# Patient Record
Sex: FEMALE | Race: WHITE | NOT HISPANIC OR LATINO | Employment: FULL TIME | URBAN - NONMETROPOLITAN AREA
[De-identification: names, ages, dates, MRNs, and addresses within clinical notes are randomized per-mention and may not be internally consistent; named-entity substitution may affect disease eponyms.]

---

## 2024-05-15 ENCOUNTER — APPOINTMENT (OUTPATIENT)
Dept: PRIMARY CARE | Facility: CLINIC | Age: 54
End: 2024-05-15
Payer: COMMERCIAL

## 2024-05-22 ENCOUNTER — OFFICE VISIT (OUTPATIENT)
Dept: PRIMARY CARE | Facility: CLINIC | Age: 54
End: 2024-05-22
Payer: COMMERCIAL

## 2024-05-22 VITALS
WEIGHT: 137.25 LBS | BODY MASS INDEX: 25.91 KG/M2 | HEART RATE: 74 BPM | DIASTOLIC BLOOD PRESSURE: 84 MMHG | SYSTOLIC BLOOD PRESSURE: 130 MMHG | HEIGHT: 61 IN

## 2024-05-22 DIAGNOSIS — M79.7 FIBROMYALGIA: ICD-10-CM

## 2024-05-22 DIAGNOSIS — J30.2 SEASONAL ALLERGIES: ICD-10-CM

## 2024-05-22 DIAGNOSIS — I10 PRIMARY HYPERTENSION: ICD-10-CM

## 2024-05-22 DIAGNOSIS — E78.2 MIXED HYPERLIPIDEMIA: ICD-10-CM

## 2024-05-22 DIAGNOSIS — R73.03 PREDIABETES: ICD-10-CM

## 2024-05-22 DIAGNOSIS — Z12.11 SCREEN FOR COLON CANCER: ICD-10-CM

## 2024-05-22 DIAGNOSIS — Z13.21 ENCOUNTER FOR VITAMIN DEFICIENCY SCREENING: ICD-10-CM

## 2024-05-22 DIAGNOSIS — E03.9 ACQUIRED HYPOTHYROIDISM: ICD-10-CM

## 2024-05-22 DIAGNOSIS — Z76.89 ENCOUNTER TO ESTABLISH CARE: Primary | ICD-10-CM

## 2024-05-22 DIAGNOSIS — Z12.31 ENCOUNTER FOR SCREENING MAMMOGRAM FOR MALIGNANT NEOPLASM OF BREAST: ICD-10-CM

## 2024-05-22 PROBLEM — K57.90 DIVERTICULOSIS: Status: ACTIVE | Noted: 2024-05-22

## 2024-05-22 PROBLEM — M25.50 MULTIPLE JOINT PAIN: Status: RESOLVED | Noted: 2017-09-22 | Resolved: 2024-05-22

## 2024-05-22 PROBLEM — E78.5 HYPERLIPIDEMIA: Status: ACTIVE | Noted: 2021-10-25

## 2024-05-22 PROBLEM — U07.1 COVID-19: Status: RESOLVED | Noted: 2021-10-11 | Resolved: 2024-05-22

## 2024-05-22 PROBLEM — F32.1 MODERATE MAJOR DEPRESSION (MULTI): Status: RESOLVED | Noted: 2022-11-02 | Resolved: 2024-05-22

## 2024-05-22 PROBLEM — N60.01 CYST OF RIGHT BREAST: Status: RESOLVED | Noted: 2021-10-25 | Resolved: 2024-05-22

## 2024-05-22 PROBLEM — R42 VERTIGO: Status: RESOLVED | Noted: 2021-11-09 | Resolved: 2024-05-22

## 2024-05-22 PROBLEM — G47.00 INSOMNIA: Status: RESOLVED | Noted: 2021-11-09 | Resolved: 2024-05-22

## 2024-05-22 PROBLEM — D64.9 ANEMIA: Status: RESOLVED | Noted: 2021-11-09 | Resolved: 2024-05-22

## 2024-05-22 PROBLEM — Z90.711 HISTORY OF PARTIAL HYSTERECTOMY: Status: RESOLVED | Noted: 2021-10-25 | Resolved: 2024-05-22

## 2024-05-22 PROBLEM — R16.0 ENLARGED LIVER: Status: ACTIVE | Noted: 2024-05-22

## 2024-05-22 PROCEDURE — 3079F DIAST BP 80-89 MM HG: CPT | Performed by: NURSE PRACTITIONER

## 2024-05-22 PROCEDURE — 3075F SYST BP GE 130 - 139MM HG: CPT | Performed by: NURSE PRACTITIONER

## 2024-05-22 PROCEDURE — 99204 OFFICE O/P NEW MOD 45 MIN: CPT | Performed by: NURSE PRACTITIONER

## 2024-05-22 PROCEDURE — 3008F BODY MASS INDEX DOCD: CPT | Performed by: NURSE PRACTITIONER

## 2024-05-22 PROCEDURE — 1036F TOBACCO NON-USER: CPT | Performed by: NURSE PRACTITIONER

## 2024-05-22 RX ORDER — MONTELUKAST SODIUM 10 MG/1
10 TABLET ORAL NIGHTLY
COMMUNITY
End: 2024-05-22 | Stop reason: SDUPTHER

## 2024-05-22 RX ORDER — LEVOTHYROXINE SODIUM 75 UG/1
75 TABLET ORAL DAILY
COMMUNITY

## 2024-05-22 RX ORDER — LISINOPRIL 20 MG/1
20 TABLET ORAL
COMMUNITY
Start: 2023-07-06 | End: 2024-06-03

## 2024-05-22 RX ORDER — MONTELUKAST SODIUM 10 MG/1
10 TABLET ORAL NIGHTLY
Qty: 90 TABLET | Refills: 1 | Status: SHIPPED | OUTPATIENT
Start: 2024-05-22

## 2024-05-22 RX ORDER — ROSUVASTATIN CALCIUM 10 MG/1
10 TABLET, COATED ORAL DAILY
COMMUNITY
Start: 2023-12-12

## 2024-05-22 ASSESSMENT — ENCOUNTER SYMPTOMS
NAUSEA: 0
VOMITING: 0
COLOR CHANGE: 0
DYSURIA: 0
CHEST TIGHTNESS: 0
FATIGUE: 1
CONSTIPATION: 0
PALPITATIONS: 0
DIZZINESS: 0
BLOOD IN STOOL: 0
ARTHRALGIAS: 1
LIGHT-HEADEDNESS: 0
DIARRHEA: 0
HEADACHES: 0
ABDOMINAL PAIN: 0
MYALGIAS: 1
SHORTNESS OF BREATH: 0
PSYCHIATRIC NEGATIVE: 1

## 2024-05-22 NOTE — ASSESSMENT & PLAN NOTE
We discussed trial of Cymbalta and muscle relaxer.   She refused at this time. She reports that she just weaned herself off of fluoxetine because it made her too tired.

## 2024-05-22 NOTE — PROGRESS NOTES
"Subjective   Patient ID: Mariaelena Donis is a 54 y.o. female who presents for Establish Care.    HPI   NPV    Mariaelena here to establish care. Previous PCP Dr. Valverde. Has history of hypothyroid, HTN, Hyperlipidemia, depression/anxiety, diverticulosis, fibromyalgia.     Has noticed she has gained weight recently about 5#. Has not had her thyroid levels checked for about a year.  Feeling fatigued.     Hypothyroid: has been  stable, needs lab work.     HTN: slightly elevated today, has been on lisinopril 20 mg. She denies chest pain/tightness. No edema noted.     Prediabetes: she was unaware of this diagnosis. Last A1c done 12/2022. Will get this rechecked.     Fibromyalgia: she is asking for something stronger than over the counter for pain relief of fibromyalgia. .         Mammogram: needs  Colon cancer screen: referred to general surgery    Review of Systems   Constitutional:  Positive for fatigue.   HENT: Negative.     Respiratory:  Negative for chest tightness and shortness of breath.    Cardiovascular:  Negative for chest pain, palpitations and leg swelling.   Gastrointestinal:  Negative for abdominal pain, blood in stool, constipation, diarrhea, nausea and vomiting.   Genitourinary:  Negative for dysuria.   Musculoskeletal:  Positive for arthralgias and myalgias.   Skin:  Negative for color change.   Neurological:  Negative for dizziness, light-headedness and headaches.   Psychiatric/Behavioral: Negative.         Objective   /84   Pulse 74   Ht 1.549 m (5' 1\")   Wt 62.3 kg (137 lb 4 oz)   BMI 25.93 kg/m²     Physical Exam  Vitals and nursing note reviewed.   Constitutional:       Appearance: Normal appearance.   HENT:      Head: Normocephalic and atraumatic.   Cardiovascular:      Rate and Rhythm: Normal rate and regular rhythm.      Pulses: Normal pulses.      Heart sounds: Normal heart sounds.   Pulmonary:      Effort: Pulmonary effort is normal.      Breath sounds: Normal breath sounds.   Abdominal: "      General: Bowel sounds are normal.      Palpations: Abdomen is soft.   Musculoskeletal:      Cervical back: Normal range of motion.   Skin:     General: Skin is warm and dry.   Neurological:      General: No focal deficit present.      Mental Status: She is alert and oriented to person, place, and time.   Psychiatric:         Mood and Affect: Mood normal.         Behavior: Behavior normal.         Thought Content: Thought content normal.         Judgment: Judgment normal.         Assessment/Plan   Problem List Items Addressed This Visit             ICD-10-CM    Fibromyalgia M79.7     We discussed trial of Cymbalta and muscle relaxer.   She refused at this time. She reports that she just weaned herself off of fluoxetine because it made her too tired.          Hyperlipidemia E78.5     Rosuvastatin 10 mg daily   Repeat lipid panel         Relevant Orders    Comprehensive Metabolic Panel    Lipid Panel    Hypertension I10     A little elevated today.   Lisinopril 20 mg daily  Continue to take medications as prescribed. Continue diet (low salt) and exercise as advised.          Relevant Orders    CBC and Auto Differential    Hypothyroidism E03.9     Check thyroid function.  Currently on levothyroxine 75 mcg daily.          Relevant Orders    TSH with reflex to Free T4 if abnormal    Prediabetes R73.03     Check A1c  12/14/22: Last A1c was 5.8%-she was unaware of this diagnosis.         Relevant Orders    Hemoglobin A1C    BMI 25.0-25.9,adult Z68.25     Other Visit Diagnoses         Codes    Encounter to establish care    -  Primary Z76.89    Seasonal allergies     J30.2    Relevant Medications    montelukast (Singulair) 10 mg tablet    Encounter for vitamin deficiency screening     Z13.21    Relevant Orders    Vitamin D 25-Hydroxy,Total (for eval of Vitamin D levels)    Vitamin B12    Encounter for screening mammogram for malignant neoplasm of breast     Z12.31    Relevant Orders    BI mammo bilateral screening  tomosynthesis    Screen for colon cancer     Z12.11    Relevant Orders    Referral to General Surgery              Follow up in 6 months. We will contact her with results of her lab work.

## 2024-05-22 NOTE — ASSESSMENT & PLAN NOTE
A little elevated today.   Lisinopril 20 mg daily  Continue to take medications as prescribed. Continue diet (low salt) and exercise as advised.

## 2024-05-23 ENCOUNTER — TELEPHONE (OUTPATIENT)
Dept: SURGERY | Facility: CLINIC | Age: 54
End: 2024-05-23
Payer: COMMERCIAL

## 2024-05-23 NOTE — TELEPHONE ENCOUNTER
Received open accessed from Tori Carolina  .  Left message on  5-23-24   . If patient does not return our call we will return open access back to referring provider.     Lmom 7-9-24.    [id number]    Sent referral back to Tori Negron office on 12-20-24.

## 2024-05-28 ENCOUNTER — LAB (OUTPATIENT)
Dept: LAB | Facility: LAB | Age: 54
End: 2024-05-28
Payer: COMMERCIAL

## 2024-05-28 DIAGNOSIS — Z13.21 ENCOUNTER FOR VITAMIN DEFICIENCY SCREENING: ICD-10-CM

## 2024-05-28 DIAGNOSIS — R73.03 PREDIABETES: ICD-10-CM

## 2024-05-28 DIAGNOSIS — I10 PRIMARY HYPERTENSION: ICD-10-CM

## 2024-05-28 DIAGNOSIS — E03.9 ACQUIRED HYPOTHYROIDISM: ICD-10-CM

## 2024-05-28 DIAGNOSIS — D64.9 ANEMIA, UNSPECIFIED TYPE: ICD-10-CM

## 2024-05-28 DIAGNOSIS — E78.2 MIXED HYPERLIPIDEMIA: ICD-10-CM

## 2024-05-28 LAB
25(OH)D3 SERPL-MCNC: 42 NG/ML (ref 30–100)
ALBUMIN SERPL BCP-MCNC: 4.4 G/DL (ref 3.4–5)
ALP SERPL-CCNC: 101 U/L (ref 33–110)
ALT SERPL W P-5'-P-CCNC: 19 U/L (ref 7–45)
ANION GAP SERPL CALC-SCNC: 14 MMOL/L (ref 10–20)
AST SERPL W P-5'-P-CCNC: 26 U/L (ref 9–39)
BASOPHILS # BLD AUTO: 0.01 X10*3/UL (ref 0–0.1)
BASOPHILS NFR BLD AUTO: 0.2 %
BILIRUB SERPL-MCNC: 0.4 MG/DL (ref 0–1.2)
BUN SERPL-MCNC: 18 MG/DL (ref 6–23)
CALCIUM SERPL-MCNC: 9.4 MG/DL (ref 8.6–10.3)
CHLORIDE SERPL-SCNC: 106 MMOL/L (ref 98–107)
CHOLEST SERPL-MCNC: 160 MG/DL (ref 0–199)
CHOLESTEROL/HDL RATIO: 2.9
CO2 SERPL-SCNC: 26 MMOL/L (ref 21–32)
CREAT SERPL-MCNC: 0.73 MG/DL (ref 0.5–1.05)
EGFRCR SERPLBLD CKD-EPI 2021: >90 ML/MIN/1.73M*2
EOSINOPHIL # BLD AUTO: 0.18 X10*3/UL (ref 0–0.7)
EOSINOPHIL NFR BLD AUTO: 2.8 %
ERYTHROCYTE [DISTWIDTH] IN BLOOD BY AUTOMATED COUNT: 13.2 % (ref 11.5–14.5)
EST. AVERAGE GLUCOSE BLD GHB EST-MCNC: 117 MG/DL
GLUCOSE SERPL-MCNC: 99 MG/DL (ref 74–99)
HBA1C MFR BLD: 5.7 %
HCT VFR BLD AUTO: 34 % (ref 36–46)
HDLC SERPL-MCNC: 56 MG/DL
HGB BLD-MCNC: 11.3 G/DL (ref 12–16)
IMM GRANULOCYTES # BLD AUTO: 0.01 X10*3/UL (ref 0–0.7)
IMM GRANULOCYTES NFR BLD AUTO: 0.2 % (ref 0–0.9)
LDLC SERPL CALC-MCNC: 69 MG/DL
LYMPHOCYTES # BLD AUTO: 2.42 X10*3/UL (ref 1.2–4.8)
LYMPHOCYTES NFR BLD AUTO: 37.5 %
MCH RBC QN AUTO: 28.8 PG (ref 26–34)
MCHC RBC AUTO-ENTMCNC: 33.2 G/DL (ref 32–36)
MCV RBC AUTO: 87 FL (ref 80–100)
MONOCYTES # BLD AUTO: 0.63 X10*3/UL (ref 0.1–1)
MONOCYTES NFR BLD AUTO: 9.8 %
NEUTROPHILS # BLD AUTO: 3.21 X10*3/UL (ref 1.2–7.7)
NEUTROPHILS NFR BLD AUTO: 49.5 %
NON HDL CHOLESTEROL: 104 MG/DL (ref 0–149)
NRBC BLD-RTO: 0 /100 WBCS (ref 0–0)
PLATELET # BLD AUTO: 316 X10*3/UL (ref 150–450)
POTASSIUM SERPL-SCNC: 3.8 MMOL/L (ref 3.5–5.3)
PROT SERPL-MCNC: 6.8 G/DL (ref 6.4–8.2)
RBC # BLD AUTO: 3.92 X10*6/UL (ref 4–5.2)
SODIUM SERPL-SCNC: 142 MMOL/L (ref 136–145)
TRIGL SERPL-MCNC: 175 MG/DL (ref 0–149)
TSH SERPL-ACNC: 1.24 MIU/L (ref 0.44–3.98)
VIT B12 SERPL-MCNC: 174 PG/ML (ref 211–911)
VLDL: 35 MG/DL (ref 0–40)
WBC # BLD AUTO: 6.5 X10*3/UL (ref 4.4–11.3)

## 2024-05-28 PROCEDURE — 85025 COMPLETE CBC W/AUTO DIFF WBC: CPT

## 2024-05-28 PROCEDURE — 84443 ASSAY THYROID STIM HORMONE: CPT

## 2024-05-28 PROCEDURE — 36415 COLL VENOUS BLD VENIPUNCTURE: CPT

## 2024-05-28 PROCEDURE — 82728 ASSAY OF FERRITIN: CPT

## 2024-05-28 PROCEDURE — 80061 LIPID PANEL: CPT

## 2024-05-28 PROCEDURE — 83540 ASSAY OF IRON: CPT

## 2024-05-28 PROCEDURE — 80053 COMPREHEN METABOLIC PANEL: CPT

## 2024-05-28 PROCEDURE — 82746 ASSAY OF FOLIC ACID SERUM: CPT

## 2024-05-28 PROCEDURE — 83550 IRON BINDING TEST: CPT

## 2024-05-28 PROCEDURE — 82607 VITAMIN B-12: CPT

## 2024-05-28 PROCEDURE — 82306 VITAMIN D 25 HYDROXY: CPT

## 2024-05-28 PROCEDURE — 83036 HEMOGLOBIN GLYCOSYLATED A1C: CPT

## 2024-05-29 DIAGNOSIS — D64.9 ANEMIA, UNSPECIFIED TYPE: Primary | ICD-10-CM

## 2024-05-29 DIAGNOSIS — E53.8 VITAMIN B12 DEFICIENCY: ICD-10-CM

## 2024-05-29 LAB
FERRITIN SERPL-MCNC: 24 NG/ML (ref 8–150)
FOLATE SERPL-MCNC: >22.3 NG/ML
IRON SATN MFR SERPL: 21 % (ref 25–45)
IRON SERPL-MCNC: 85 UG/DL (ref 35–150)
TIBC SERPL-MCNC: 401 UG/DL (ref 240–445)
UIBC SERPL-MCNC: 316 UG/DL (ref 110–370)

## 2024-05-29 RX ORDER — LANOLIN ALCOHOL/MO/W.PET/CERES
1000 CREAM (GRAM) TOPICAL DAILY
Qty: 90 TABLET | Refills: 1 | Status: SHIPPED | OUTPATIENT
Start: 2024-05-29

## 2024-05-29 NOTE — RESULT ENCOUNTER NOTE
Vitamin B12 level low at 174---start B12 1000 mcg daily--sent to pharmacy  A1c 5.7%  Lipid panel okay--triglycerides slightly elevated at 175  Blood counts?? Anemia--add iron studies  Vitamin D fine  TSH fine  Electrolyte/liver/kidney panel fine    EPIC message sent

## 2024-05-30 ENCOUNTER — HOSPITAL ENCOUNTER (OUTPATIENT)
Dept: RADIOLOGY | Facility: CLINIC | Age: 54
End: 2024-05-30
Payer: COMMERCIAL

## 2024-06-04 ENCOUNTER — HOSPITAL ENCOUNTER (OUTPATIENT)
Dept: RADIOLOGY | Facility: HOSPITAL | Age: 54
Discharge: HOME | End: 2024-06-04
Payer: COMMERCIAL

## 2024-06-04 VITALS — HEIGHT: 60 IN | WEIGHT: 135 LBS | BODY MASS INDEX: 26.5 KG/M2

## 2024-06-04 DIAGNOSIS — Z12.31 ENCOUNTER FOR SCREENING MAMMOGRAM FOR MALIGNANT NEOPLASM OF BREAST: ICD-10-CM

## 2024-06-04 PROCEDURE — 77067 SCR MAMMO BI INCL CAD: CPT | Performed by: RADIOLOGY

## 2024-06-04 PROCEDURE — 77063 BREAST TOMOSYNTHESIS BI: CPT | Performed by: RADIOLOGY

## 2024-06-04 PROCEDURE — 77067 SCR MAMMO BI INCL CAD: CPT

## 2024-06-10 ENCOUNTER — HOSPITAL ENCOUNTER (OUTPATIENT)
Dept: RADIOLOGY | Facility: EXTERNAL LOCATION | Age: 54
Discharge: HOME | End: 2024-06-10
Payer: COMMERCIAL

## 2024-06-12 DIAGNOSIS — I10 PRIMARY HYPERTENSION: Primary | ICD-10-CM

## 2024-06-12 RX ORDER — LISINOPRIL 20 MG/1
20 TABLET ORAL
Qty: 90 TABLET | Refills: 1 | Status: SHIPPED | OUTPATIENT
Start: 2024-06-12

## 2024-07-03 ENCOUNTER — TELEPHONE (OUTPATIENT)
Dept: PRIMARY CARE | Facility: CLINIC | Age: 54
End: 2024-07-03
Payer: COMMERCIAL

## 2024-07-03 DIAGNOSIS — K57.92 DIVERTICULITIS: Primary | ICD-10-CM

## 2024-07-03 RX ORDER — METRONIDAZOLE 500 MG/1
500 TABLET ORAL 3 TIMES DAILY
Qty: 21 TABLET | Refills: 0 | Status: SHIPPED | OUTPATIENT
Start: 2024-07-03 | End: 2024-07-10

## 2024-07-03 RX ORDER — CIPROFLOXACIN 500 MG/1
500 TABLET ORAL 2 TIMES DAILY
Qty: 14 TABLET | Refills: 0 | Status: SHIPPED | OUTPATIENT
Start: 2024-07-03 | End: 2024-07-10

## 2024-07-03 NOTE — TELEPHONE ENCOUNTER
Mariaelena called in she is having a flare up of her diverticulitis   low grade fever, abdominal pain, constipation, nausea      She is asking for ATRYAN Bobo

## 2024-07-11 ENCOUNTER — HOSPITAL ENCOUNTER (EMERGENCY)
Facility: HOSPITAL | Age: 54
Discharge: HOME | End: 2024-07-11
Attending: EMERGENCY MEDICINE
Payer: COMMERCIAL

## 2024-07-11 ENCOUNTER — APPOINTMENT (OUTPATIENT)
Dept: RADIOLOGY | Facility: HOSPITAL | Age: 54
End: 2024-07-11
Payer: COMMERCIAL

## 2024-07-11 VITALS
WEIGHT: 135 LBS | TEMPERATURE: 98.3 F | OXYGEN SATURATION: 98 % | BODY MASS INDEX: 25.49 KG/M2 | SYSTOLIC BLOOD PRESSURE: 145 MMHG | HEIGHT: 61 IN | DIASTOLIC BLOOD PRESSURE: 76 MMHG | RESPIRATION RATE: 16 BRPM | HEART RATE: 70 BPM

## 2024-07-11 DIAGNOSIS — R10.9 ABDOMINAL PAIN, UNSPECIFIED ABDOMINAL LOCATION: Primary | ICD-10-CM

## 2024-07-11 LAB
ALBUMIN SERPL BCP-MCNC: 4.4 G/DL (ref 3.4–5)
ALP SERPL-CCNC: 110 U/L (ref 33–110)
ALT SERPL W P-5'-P-CCNC: 21 U/L (ref 7–45)
ANION GAP SERPL CALC-SCNC: 13 MMOL/L (ref 10–20)
APPEARANCE UR: CLEAR
AST SERPL W P-5'-P-CCNC: 27 U/L (ref 9–39)
BASOPHILS # BLD AUTO: 0.01 X10*3/UL (ref 0–0.1)
BASOPHILS NFR BLD AUTO: 0.1 %
BILIRUB SERPL-MCNC: 0.3 MG/DL (ref 0–1.2)
BILIRUB UR STRIP.AUTO-MCNC: NEGATIVE MG/DL
BUN SERPL-MCNC: 12 MG/DL (ref 6–23)
CALCIUM SERPL-MCNC: 9.3 MG/DL (ref 8.6–10.3)
CHLORIDE SERPL-SCNC: 102 MMOL/L (ref 98–107)
CO2 SERPL-SCNC: 27 MMOL/L (ref 21–32)
COLOR UR: COLORLESS
CREAT SERPL-MCNC: 0.66 MG/DL (ref 0.5–1.05)
EGFRCR SERPLBLD CKD-EPI 2021: >90 ML/MIN/1.73M*2
EOSINOPHIL # BLD AUTO: 0.11 X10*3/UL (ref 0–0.7)
EOSINOPHIL NFR BLD AUTO: 1.2 %
ERYTHROCYTE [DISTWIDTH] IN BLOOD BY AUTOMATED COUNT: 12.9 % (ref 11.5–14.5)
GLUCOSE SERPL-MCNC: 107 MG/DL (ref 74–99)
GLUCOSE UR STRIP.AUTO-MCNC: NORMAL MG/DL
HCT VFR BLD AUTO: 35.9 % (ref 36–46)
HGB BLD-MCNC: 11.7 G/DL (ref 12–16)
IMM GRANULOCYTES # BLD AUTO: 0.03 X10*3/UL (ref 0–0.7)
IMM GRANULOCYTES NFR BLD AUTO: 0.3 % (ref 0–0.9)
KETONES UR STRIP.AUTO-MCNC: NEGATIVE MG/DL
LEUKOCYTE ESTERASE UR QL STRIP.AUTO: NEGATIVE
LIPASE SERPL-CCNC: 28 U/L (ref 9–82)
LYMPHOCYTES # BLD AUTO: 2.13 X10*3/UL (ref 1.2–4.8)
LYMPHOCYTES NFR BLD AUTO: 23.4 %
MCH RBC QN AUTO: 28.6 PG (ref 26–34)
MCHC RBC AUTO-ENTMCNC: 32.6 G/DL (ref 32–36)
MCV RBC AUTO: 88 FL (ref 80–100)
MONOCYTES # BLD AUTO: 0.64 X10*3/UL (ref 0.1–1)
MONOCYTES NFR BLD AUTO: 7 %
NEUTROPHILS # BLD AUTO: 6.19 X10*3/UL (ref 1.2–7.7)
NEUTROPHILS NFR BLD AUTO: 68 %
NITRITE UR QL STRIP.AUTO: NEGATIVE
NRBC BLD-RTO: 0 /100 WBCS (ref 0–0)
PH UR STRIP.AUTO: 6.5 [PH]
PLATELET # BLD AUTO: 373 X10*3/UL (ref 150–450)
POTASSIUM SERPL-SCNC: 3.8 MMOL/L (ref 3.5–5.3)
PROT SERPL-MCNC: 7.4 G/DL (ref 6.4–8.2)
PROT UR STRIP.AUTO-MCNC: NEGATIVE MG/DL
RBC # BLD AUTO: 4.09 X10*6/UL (ref 4–5.2)
RBC # UR STRIP.AUTO: NEGATIVE /UL
SODIUM SERPL-SCNC: 138 MMOL/L (ref 136–145)
SP GR UR STRIP.AUTO: 1.01
UROBILINOGEN UR STRIP.AUTO-MCNC: NORMAL MG/DL
WBC # BLD AUTO: 9.1 X10*3/UL (ref 4.4–11.3)

## 2024-07-11 PROCEDURE — 2500000004 HC RX 250 GENERAL PHARMACY W/ HCPCS (ALT 636 FOR OP/ED): Performed by: EMERGENCY MEDICINE

## 2024-07-11 PROCEDURE — 81003 URINALYSIS AUTO W/O SCOPE: CPT | Performed by: EMERGENCY MEDICINE

## 2024-07-11 PROCEDURE — 74177 CT ABD & PELVIS W/CONTRAST: CPT | Performed by: RADIOLOGY

## 2024-07-11 PROCEDURE — 85025 COMPLETE CBC W/AUTO DIFF WBC: CPT | Performed by: EMERGENCY MEDICINE

## 2024-07-11 PROCEDURE — 74177 CT ABD & PELVIS W/CONTRAST: CPT

## 2024-07-11 PROCEDURE — 80053 COMPREHEN METABOLIC PANEL: CPT | Performed by: EMERGENCY MEDICINE

## 2024-07-11 PROCEDURE — 36415 COLL VENOUS BLD VENIPUNCTURE: CPT | Performed by: EMERGENCY MEDICINE

## 2024-07-11 PROCEDURE — 83690 ASSAY OF LIPASE: CPT | Performed by: EMERGENCY MEDICINE

## 2024-07-11 PROCEDURE — 99284 EMERGENCY DEPT VISIT MOD MDM: CPT | Mod: 25

## 2024-07-11 PROCEDURE — 2550000001 HC RX 255 CONTRASTS: Performed by: EMERGENCY MEDICINE

## 2024-07-11 PROCEDURE — 96360 HYDRATION IV INFUSION INIT: CPT | Mod: 59

## 2024-07-11 RX ORDER — PANTOPRAZOLE SODIUM 20 MG/1
20 TABLET, DELAYED RELEASE ORAL DAILY
Qty: 20 TABLET | Refills: 0 | Status: SHIPPED | OUTPATIENT
Start: 2024-07-11 | End: 2024-07-31

## 2024-07-11 RX ORDER — NITROFURANTOIN 25; 75 MG/1; MG/1
100 CAPSULE ORAL EVERY 12 HOURS SCHEDULED
COMMUNITY
Start: 2024-06-28 | End: 2024-07-11 | Stop reason: ENTERED-IN-ERROR

## 2024-07-11 RX ADMIN — SODIUM CHLORIDE 1000 ML: 9 INJECTION, SOLUTION INTRAVENOUS at 13:45

## 2024-07-11 RX ADMIN — IOHEXOL 70 ML: 350 INJECTION, SOLUTION INTRAVENOUS at 14:27

## 2024-07-11 ASSESSMENT — PAIN DESCRIPTION - LOCATION: LOCATION: ABDOMEN

## 2024-07-11 ASSESSMENT — PAIN - FUNCTIONAL ASSESSMENT: PAIN_FUNCTIONAL_ASSESSMENT: 0-10

## 2024-07-11 ASSESSMENT — COLUMBIA-SUICIDE SEVERITY RATING SCALE - C-SSRS
2. HAVE YOU ACTUALLY HAD ANY THOUGHTS OF KILLING YOURSELF?: NO
6. HAVE YOU EVER DONE ANYTHING, STARTED TO DO ANYTHING, OR PREPARED TO DO ANYTHING TO END YOUR LIFE?: NO
1. IN THE PAST MONTH, HAVE YOU WISHED YOU WERE DEAD OR WISHED YOU COULD GO TO SLEEP AND NOT WAKE UP?: NO

## 2024-07-11 ASSESSMENT — PAIN DESCRIPTION - PAIN TYPE: TYPE: ACUTE PAIN

## 2024-07-11 ASSESSMENT — PAIN DESCRIPTION - ORIENTATION: ORIENTATION: LEFT;LOWER

## 2024-07-11 ASSESSMENT — PAIN SCALES - GENERAL: PAINLEVEL_OUTOF10: 10 - WORST POSSIBLE PAIN

## 2024-07-11 NOTE — ED PROVIDER NOTES
HPI   Chief Complaint   Patient presents with    Abdominal Pain     LRQ abdominal pain today 10/10.  Hx of diverticulitis.  Nauseated.   Was on antibiotics but stopped.        Patient presents to the emergency department secondary to abdominal pain.  Further history reveals that the patient has a longstanding history of diverticular disease.  She called her physician's office 8 days ago secondary to left lower quadrant pain which she felt was secondary to diverticulitis, however she only took the antibiotics for 3 days if she felt it was making her symptoms worse.  Presents now to the emergency room to be further evaluated.  Last p.o. intake was last night consisting of a fried chicken sandwich.      History provided by:  Patient   used: No                        No data recorded                   Patient History   Past Medical History:   Diagnosis Date    Anemia 11/09/2021    Last Assessment & Plan:    Formatting of this note might be different from the original.   Unsure about the reason for her acute anemia if it is related to acute illness versus other etiology.  Could be a reason for her worsening dizziness.  It is normocytic normochromic anemia which goes against vitamin B12 deficiency or folate these were also tested and negative or iron deficiency anemia.  We wi    COVID-19 10/11/2021    Last Assessment & Plan:    Formatting of this note might be different from the original.   -echo to evaluate for left heart function    -CT 10/9/21 showed 'enlarged heart'-concern for cardiomyopathy    No symptoms at this time    Disease of thyroid gland     History of partial hysterectomy 10/25/2021    Last Assessment & Plan:    Formatting of this note might be different from the original.   Referral to [facility]   -pt has not had pap smear in >5 years   -hx of partial hysterectomy -cervix intact    Hypertension     Insomnia 11/09/2021    Last Assessment & Plan:    Formatting of this note might  be different from the original.   Trial of trazodone today at 50 mg, if intolerant to side effects or having increased sedation can decrease to 25 mg.      Nonpharmacological treatment of insomnia      FDA approved Somrest and Sleepio       Sleep hygiene       Encourage good sleeping habits   1.Regular sleep schedule, avoid naps   2.Avoid ca    Moderate major depression (Multi) 11/02/2022    Last Assessment & Plan:    Formatting of this note might be different from the original.   PHQ-9 score of 7 MELLISSA-7 score of 3.   Slightly improving on increase Prozac at 40 mg.   With a low energy she is having a might benefit from Wellbutrin in addition, starting at 150.   Follow-up in 3 months.   Still hesitant to pursue counseling at this time.    Multiple joint pain 09/22/2017    Vertigo 11/09/2021    Last Assessment & Plan:    Formatting of this note might be different from the original.   Completely resolved not on any medications for it . Most likely post COVID infection related.   Continue to monitor     Past Surgical History:   Procedure Laterality Date    CHOLECYSTECTOMY      HYSTERECTOMY       Family History   Problem Relation Name Age of Onset    Hypertension Mother       Social History     Tobacco Use    Smoking status: Never    Smokeless tobacco: Never   Vaping Use    Vaping status: Never Used   Substance Use Topics    Alcohol use: Never    Drug use: Never       Physical Exam   ED Triage Vitals [07/11/24 1303]   Temperature Heart Rate Respirations BP   36.8 °C (98.3 °F) 71 16 134/79      Pulse Ox Temp Source Heart Rate Source Patient Position   98 % Temporal Monitor Sitting      BP Location FiO2 (%)     Left arm --       Physical Exam  Vitals and nursing note reviewed.   Constitutional:       General: She is not in acute distress.     Appearance: Normal appearance. She is normal weight. She is not ill-appearing, toxic-appearing or diaphoretic.   HENT:      Head: Normocephalic and atraumatic.      Nose: Nose normal.  No rhinorrhea.   Eyes:      General: No scleral icterus.  Neck:      Comments: Trachea is midline  Cardiovascular:      Rate and Rhythm: Normal rate and regular rhythm.      Heart sounds: No murmur heard.  Pulmonary:      Effort: Pulmonary effort is normal.      Breath sounds: Normal breath sounds. No wheezing.   Abdominal:      General: Abdomen is flat. Bowel sounds are normal. There is no distension.      Palpations: Abdomen is soft.      Tenderness: There is abdominal tenderness in the left lower quadrant. There is no right CVA tenderness, left CVA tenderness, guarding or rebound. Negative signs include Paredes's sign, Rovsing's sign, McBurney's sign, psoas sign and obturator sign.   Musculoskeletal:         General: Normal range of motion.      Cervical back: Normal range of motion.   Skin:     General: Skin is warm and dry.      Coloration: Skin is not jaundiced.      Findings: No rash.   Neurological:      General: No focal deficit present.      Mental Status: She is alert and oriented to person, place, and time. Mental status is at baseline.   Psychiatric:         Mood and Affect: Mood normal.         Behavior: Behavior normal.         Thought Content: Thought content normal.         Judgment: Judgment normal.         ED Course & MDM   Diagnoses as of 07/11/24 1519   Abdominal pain, unspecified abdominal location       Medical Decision Making  I printed off a copy of the patient's CAT scan report and highlighted the finding of the pancreatic lesion.  This has been present since October 2021 making it unlikely to be malignant however the interpreting radiologist is recommending a follow-up MRI for further clarification and this was explained to the patient and she understands this importance.  Patient will be referred to gastroenterology for follow-up.  She has never had a colonoscopy.  I explained the importance of this not only in regards to further diagnosing her current symptomatology but also from cancer  screening and she understands this importance.  She will be placed on a proton pump inhibitor and instructed to follow a bland diet.  Return at anytime if worse.        Procedure  Procedures     Maximo Mckeon,   07/11/24 1528

## 2024-07-12 LAB — HOLD SPECIMEN: NORMAL

## 2024-11-20 ENCOUNTER — APPOINTMENT (OUTPATIENT)
Dept: PRIMARY CARE | Facility: CLINIC | Age: 54
End: 2024-11-20
Payer: COMMERCIAL

## 2024-11-26 ENCOUNTER — APPOINTMENT (OUTPATIENT)
Age: 54
End: 2024-11-26
Payer: COMMERCIAL

## 2024-11-27 ENCOUNTER — APPOINTMENT (OUTPATIENT)
Age: 54
End: 2024-11-27
Payer: COMMERCIAL

## 2024-12-03 DIAGNOSIS — I10 PRIMARY HYPERTENSION: ICD-10-CM

## 2024-12-10 DIAGNOSIS — E53.8 B12 DEFICIENCY: ICD-10-CM

## 2024-12-10 DIAGNOSIS — E53.8 VITAMIN B12 DEFICIENCY: ICD-10-CM

## 2024-12-10 DIAGNOSIS — E78.2 MIXED HYPERLIPIDEMIA: ICD-10-CM

## 2024-12-10 DIAGNOSIS — R73.03 PREDIABETES: Primary | ICD-10-CM

## 2024-12-10 DIAGNOSIS — I10 PRIMARY HYPERTENSION: ICD-10-CM

## 2024-12-10 RX ORDER — LISINOPRIL 20 MG/1
20 TABLET ORAL DAILY
Qty: 90 TABLET | Refills: 0 | OUTPATIENT
Start: 2024-12-10

## 2024-12-10 RX ORDER — LISINOPRIL 20 MG/1
20 TABLET ORAL
Qty: 30 TABLET | Refills: 0 | Status: SHIPPED | OUTPATIENT
Start: 2024-12-10

## 2024-12-10 RX ORDER — LANOLIN ALCOHOL/MO/W.PET/CERES
1000 CREAM (GRAM) TOPICAL DAILY
Qty: 90 TABLET | Refills: 0 | OUTPATIENT
Start: 2024-12-10

## 2024-12-11 DIAGNOSIS — E53.8 VITAMIN B12 DEFICIENCY: ICD-10-CM

## 2024-12-11 RX ORDER — LANOLIN ALCOHOL/MO/W.PET/CERES
1000 CREAM (GRAM) TOPICAL DAILY
Qty: 30 TABLET | Refills: 0 | OUTPATIENT
Start: 2024-12-11

## 2024-12-18 ENCOUNTER — APPOINTMENT (OUTPATIENT)
Age: 54
End: 2024-12-18
Payer: COMMERCIAL

## 2025-01-08 ENCOUNTER — OFFICE VISIT (OUTPATIENT)
Age: 55
End: 2025-01-08
Payer: COMMERCIAL

## 2025-01-08 ENCOUNTER — LAB (OUTPATIENT)
Facility: LAB | Age: 55
End: 2025-01-08
Payer: COMMERCIAL

## 2025-01-08 ENCOUNTER — HOSPITAL ENCOUNTER (OUTPATIENT)
Dept: RADIOLOGY | Facility: HOSPITAL | Age: 55
Discharge: HOME | End: 2025-01-08
Payer: COMMERCIAL

## 2025-01-08 VITALS
BODY MASS INDEX: 25.89 KG/M2 | HEIGHT: 61 IN | WEIGHT: 137.13 LBS | HEART RATE: 71 BPM | DIASTOLIC BLOOD PRESSURE: 80 MMHG | SYSTOLIC BLOOD PRESSURE: 124 MMHG

## 2025-01-08 DIAGNOSIS — E53.8 B12 DEFICIENCY: ICD-10-CM

## 2025-01-08 DIAGNOSIS — E03.9 ACQUIRED HYPOTHYROIDISM: ICD-10-CM

## 2025-01-08 DIAGNOSIS — R73.03 PREDIABETES: ICD-10-CM

## 2025-01-08 DIAGNOSIS — R07.81 RIB PAIN ON RIGHT SIDE: ICD-10-CM

## 2025-01-08 DIAGNOSIS — K86.2 PANCREATIC CYST (HHS-HCC): ICD-10-CM

## 2025-01-08 DIAGNOSIS — E78.2 MIXED HYPERLIPIDEMIA: ICD-10-CM

## 2025-01-08 DIAGNOSIS — I10 PRIMARY HYPERTENSION: Primary | ICD-10-CM

## 2025-01-08 DIAGNOSIS — I10 PRIMARY HYPERTENSION: ICD-10-CM

## 2025-01-08 LAB
ALBUMIN SERPL BCP-MCNC: 4.9 G/DL (ref 3.4–5)
ALP SERPL-CCNC: 110 U/L (ref 33–110)
ALT SERPL W P-5'-P-CCNC: 20 U/L (ref 7–45)
ANION GAP SERPL CALC-SCNC: 12 MMOL/L (ref 10–20)
AST SERPL W P-5'-P-CCNC: 28 U/L (ref 9–39)
BASOPHILS # BLD AUTO: 0 X10*3/UL (ref 0–0.1)
BASOPHILS NFR BLD AUTO: 0 %
BILIRUB SERPL-MCNC: 0.4 MG/DL (ref 0–1.2)
BUN SERPL-MCNC: 11 MG/DL (ref 6–23)
CALCIUM SERPL-MCNC: 9.8 MG/DL (ref 8.6–10.3)
CHLORIDE SERPL-SCNC: 105 MMOL/L (ref 98–107)
CHOLEST SERPL-MCNC: 155 MG/DL (ref 0–199)
CHOLESTEROL/HDL RATIO: 2.4
CO2 SERPL-SCNC: 29 MMOL/L (ref 21–32)
CREAT SERPL-MCNC: 0.68 MG/DL (ref 0.5–1.05)
EGFRCR SERPLBLD CKD-EPI 2021: >90 ML/MIN/1.73M*2
EOSINOPHIL # BLD AUTO: 0.15 X10*3/UL (ref 0–0.7)
EOSINOPHIL NFR BLD AUTO: 2.4 %
ERYTHROCYTE [DISTWIDTH] IN BLOOD BY AUTOMATED COUNT: 13.3 % (ref 11.5–14.5)
GLUCOSE SERPL-MCNC: 100 MG/DL (ref 74–99)
HCT VFR BLD AUTO: 37.4 % (ref 36–46)
HDLC SERPL-MCNC: 64 MG/DL
HGB BLD-MCNC: 12 G/DL (ref 12–16)
IMM GRANULOCYTES # BLD AUTO: 0.01 X10*3/UL (ref 0–0.7)
IMM GRANULOCYTES NFR BLD AUTO: 0.2 % (ref 0–0.9)
LDLC SERPL CALC-MCNC: 68 MG/DL
LYMPHOCYTES # BLD AUTO: 2.27 X10*3/UL (ref 1.2–4.8)
LYMPHOCYTES NFR BLD AUTO: 36.1 %
MCH RBC QN AUTO: 28.8 PG (ref 26–34)
MCHC RBC AUTO-ENTMCNC: 32.1 G/DL (ref 32–36)
MCV RBC AUTO: 90 FL (ref 80–100)
MONOCYTES # BLD AUTO: 0.53 X10*3/UL (ref 0.1–1)
MONOCYTES NFR BLD AUTO: 8.4 %
NEUTROPHILS # BLD AUTO: 3.33 X10*3/UL (ref 1.2–7.7)
NEUTROPHILS NFR BLD AUTO: 52.9 %
NON HDL CHOLESTEROL: 91 MG/DL (ref 0–149)
NRBC BLD-RTO: 0 /100 WBCS (ref 0–0)
PLATELET # BLD AUTO: 310 X10*3/UL (ref 150–450)
POTASSIUM SERPL-SCNC: 4.3 MMOL/L (ref 3.5–5.3)
PROT SERPL-MCNC: 6.8 G/DL (ref 6.4–8.2)
RBC # BLD AUTO: 4.16 X10*6/UL (ref 4–5.2)
SODIUM SERPL-SCNC: 142 MMOL/L (ref 136–145)
TRIGL SERPL-MCNC: 115 MG/DL (ref 0–149)
TSH SERPL-ACNC: 0.98 MIU/L (ref 0.44–3.98)
VIT B12 SERPL-MCNC: 461 PG/ML (ref 211–911)
VLDL: 23 MG/DL (ref 0–40)
WBC # BLD AUTO: 6.3 X10*3/UL (ref 4.4–11.3)

## 2025-01-08 PROCEDURE — 3074F SYST BP LT 130 MM HG: CPT | Performed by: NURSE PRACTITIONER

## 2025-01-08 PROCEDURE — 3079F DIAST BP 80-89 MM HG: CPT | Performed by: NURSE PRACTITIONER

## 2025-01-08 PROCEDURE — 99214 OFFICE O/P EST MOD 30 MIN: CPT | Performed by: NURSE PRACTITIONER

## 2025-01-08 PROCEDURE — 83036 HEMOGLOBIN GLYCOSYLATED A1C: CPT

## 2025-01-08 PROCEDURE — 71101 X-RAY EXAM UNILAT RIBS/CHEST: CPT | Mod: RT

## 2025-01-08 PROCEDURE — 1036F TOBACCO NON-USER: CPT | Performed by: NURSE PRACTITIONER

## 2025-01-08 PROCEDURE — 3008F BODY MASS INDEX DOCD: CPT | Performed by: NURSE PRACTITIONER

## 2025-01-08 PROCEDURE — 71101 X-RAY EXAM UNILAT RIBS/CHEST: CPT | Mod: RIGHT SIDE | Performed by: STUDENT IN AN ORGANIZED HEALTH CARE EDUCATION/TRAINING PROGRAM

## 2025-01-08 RX ORDER — LISINOPRIL 20 MG/1
20 TABLET ORAL
Qty: 90 TABLET | Refills: 1 | Status: SHIPPED | OUTPATIENT
Start: 2025-01-08

## 2025-01-08 RX ORDER — LEVOTHYROXINE SODIUM 75 UG/1
75 TABLET ORAL DAILY
Qty: 90 TABLET | Refills: 3 | Status: SHIPPED | OUTPATIENT
Start: 2025-01-08

## 2025-01-08 RX ORDER — ROSUVASTATIN CALCIUM 10 MG/1
10 TABLET, COATED ORAL DAILY
Qty: 90 TABLET | Refills: 3 | Status: SHIPPED | OUTPATIENT
Start: 2025-01-08

## 2025-01-08 ASSESSMENT — ENCOUNTER SYMPTOMS
MUSCULOSKELETAL NEGATIVE: 1
SHORTNESS OF BREATH: 1
CARDIOVASCULAR NEGATIVE: 1
GASTROINTESTINAL NEGATIVE: 1
ALLERGIC/IMMUNOLOGIC NEGATIVE: 1
PSYCHIATRIC NEGATIVE: 1
ENDOCRINE NEGATIVE: 1
EYES NEGATIVE: 1
NEUROLOGICAL NEGATIVE: 1
CONSTITUTIONAL NEGATIVE: 1

## 2025-01-08 ASSESSMENT — PATIENT HEALTH QUESTIONNAIRE - PHQ9
2. FEELING DOWN, DEPRESSED OR HOPELESS: NOT AT ALL
1. LITTLE INTEREST OR PLEASURE IN DOING THINGS: NOT AT ALL
SUM OF ALL RESPONSES TO PHQ9 QUESTIONS 1 AND 2: 0

## 2025-01-08 NOTE — PROGRESS NOTES
"Subjective   Patient ID: Mariaelena Donis is a 54 y.o. female who presents for Follow-up.    HPI   Mariaelena returns for follow up. She also has concerns regarding a fall yesterday.     Fell on right side, slipped on ice. No bruising noted. Hurts to breathe, take deep breath. Has been using ibuprofen without much symptom relief.     HTN: no new chest pain/tightness, palpitations, dizziness, or lower leg edema, or dizziness.     B12 def: is still taking OTC 1000 mcg daily.     Pancreatic cyst: seen on CT back in July-needs follow up with Surgical oncology-[facility] due to increase in size.     Hyperlipidemia: currently on statin. Needs updated lipid panel.    Hypothyroid: has been stable on current dose. Will get updated thyroid labs.    Colon CA screen: has not had-was referred last year. She reports she has been busy with work and school. Understands she needs to have this done, accepts risks associated with waiting and not having screening.   Mammogram: had done 6/11/24    Review of Systems   Constitutional: Negative.    HENT: Negative.     Eyes: Negative.    Respiratory:  Positive for shortness of breath (with deep breaths due to right sided rib pain).    Cardiovascular: Negative.    Gastrointestinal: Negative.    Endocrine: Negative.    Genitourinary: Negative.    Musculoskeletal: Negative.    Allergic/Immunologic: Negative.    Neurological: Negative.    Psychiatric/Behavioral: Negative.         Objective   /80   Pulse 71   Ht 1.549 m (5' 1\")   Wt 62.2 kg (137 lb 2 oz)   BMI 25.91 kg/m²     Physical Exam  Vitals and nursing note reviewed.   Constitutional:       Appearance: Normal appearance.   HENT:      Head: Normocephalic and atraumatic.   Cardiovascular:      Rate and Rhythm: Normal rate and regular rhythm.      Pulses: Normal pulses.      Heart sounds: Normal heart sounds.   Pulmonary:      Effort: Pulmonary effort is normal.      Breath sounds: Normal breath sounds.   Abdominal:      General: " Bowel sounds are normal.      Palpations: Abdomen is soft.   Musculoskeletal:         General: Normal range of motion.      Cervical back: Normal range of motion.   Skin:     General: Skin is warm and dry.   Neurological:      General: No focal deficit present.      Mental Status: She is alert and oriented to person, place, and time.   Psychiatric:         Mood and Affect: Mood normal.         Behavior: Behavior normal.         Thought Content: Thought content normal.         Judgment: Judgment normal.         Assessment/Plan   Problem List Items Addressed This Visit             ICD-10-CM    Hyperlipidemia E78.5    Relevant Medications    rosuvastatin (Crestor) 10 mg tablet    Other Relevant Orders    Lipid Panel    Comprehensive Metabolic Panel    Hypertension - Primary I10     Controlled today.   Lisinopril 20 mg daily- refilled  Continue to take medications as prescribed. Continue diet (low salt) and exercise as advised.          Relevant Medications    lisinopril 20 mg tablet    Other Relevant Orders    CBC and Auto Differential    Hypothyroidism E03.9     Check thyroid function.  Currently on levothyroxine 75 mcg daily.          Relevant Medications    levothyroxine (Synthroid, Levoxyl) 75 mcg tablet    Other Relevant Orders    TSH with reflex to Free T4 if abnormal    Prediabetes R73.03    Relevant Orders    Hemoglobin A1C    Comprehensive Metabolic Panel    BMI 25.0-25.9,adult Z68.25    B12 deficiency E53.8    Relevant Orders    Vitamin B12    Pancreatic cyst ([facility]) K86.2     Seen on CT back in July-has not had follow up yet.  1.4 x 1.1 cm hypodense lesion superior aspect of the proximal body of the pancreas (increased in size)  Is needing MRI follow up  Referral sent to surgical oncology with [facility]         Relevant Orders    Referral to Surgical Oncology     Other Visit Diagnoses         Codes    Rib pain on right side     R07.81    Relevant Orders    XR ribs right 2 views w chest pa or ap               Follow up in 6 months for chronic care. We will notify her of labs and chest xray results and refer as necessary.     For any new medications that were prescribed today, the patient was educated about their indications for use, administration, frequency and potential side effects of the medication.

## 2025-01-08 NOTE — ASSESSMENT & PLAN NOTE
Seen on CT back in July-has not had follow up yet.  1.4 x 1.1 cm hypodense lesion superior aspect of the proximal body of the pancreas (increased in size)  Is needing MRI follow up  Referral sent to surgical oncology with Tome[facility] Del [facility]

## 2025-01-08 NOTE — ASSESSMENT & PLAN NOTE
Controlled today.   Lisinopril 20 mg daily- refilled  Continue to take medications as prescribed. Continue diet (low salt) and exercise as advised.

## 2025-01-08 NOTE — LETTER
January 8, 2025     Patient: Mariaelena Donis   YOB: 1970   Date of Visit: 1/8/2025       To Whom It May Concern:    Mariaelena Donis was seen in my clinic on 1/8/2025 at 9:00 am. Please excuse Mariaelena for her absence from work on this day and yesterday 1/7/25 due to injury.    If you have any questions or concerns, please don't hesitate to call.         Sincerely,         VIDAL Hall-CNP        CC: No Recipients

## 2025-01-09 LAB
EST. AVERAGE GLUCOSE BLD GHB EST-MCNC: 114 MG/DL
HBA1C MFR BLD: 5.6 %

## 2025-01-14 ENCOUNTER — APPOINTMENT (OUTPATIENT)
Age: 55
End: 2025-01-14
Payer: COMMERCIAL

## 2025-01-14 DIAGNOSIS — R07.81 RIB PAIN ON RIGHT SIDE: Primary | ICD-10-CM

## 2025-01-14 RX ORDER — DICLOFENAC SODIUM 75 MG/1
75 TABLET, DELAYED RELEASE ORAL 2 TIMES DAILY PRN
Qty: 60 TABLET | Refills: 0 | Status: SHIPPED | OUTPATIENT
Start: 2025-01-14 | End: 2025-03-15

## 2025-01-17 DIAGNOSIS — U07.1 POSITIVE SELF-ADMINISTERED ANTIGEN TEST FOR COVID-19: Primary | ICD-10-CM

## 2025-01-21 ENCOUNTER — PATIENT OUTREACH (OUTPATIENT)
Age: 55
End: 2025-01-21
Payer: COMMERCIAL

## 2025-01-21 NOTE — PROGRESS NOTES
Discharge Facility:[facility]  Discharge Diagnosis:Acute hypoxemic respiratory failure, COVID and pneumonia, hypoxia, dehydration   Admission Date:1/17/25  Discharge Date:1/20/25    PCP Appointment Date:No contact made. Message sent to office   Specialist Appointment Date: TBD  Hospital Encounter and Summary Linked: Yes    2 call attempts made

## 2025-01-22 ENCOUNTER — TELEPHONE (OUTPATIENT)
Age: 55
End: 2025-01-22
Payer: COMMERCIAL

## 2025-01-22 NOTE — TELEPHONE ENCOUNTER
Mariaelena needs a note that she was in the hospital from 1/17/2025 till 01/21/2025    due to Covid     off  01/22/2025 till  01/27/2025   I will email her the note

## 2025-02-04 ENCOUNTER — PATIENT OUTREACH (OUTPATIENT)
Age: 55
End: 2025-02-04
Payer: COMMERCIAL

## 2025-02-05 ENCOUNTER — APPOINTMENT (OUTPATIENT)
Age: 55
End: 2025-02-05
Payer: COMMERCIAL

## 2025-02-05 VITALS
DIASTOLIC BLOOD PRESSURE: 84 MMHG | HEIGHT: 61 IN | WEIGHT: 135 LBS | BODY MASS INDEX: 25.49 KG/M2 | HEART RATE: 60 BPM | SYSTOLIC BLOOD PRESSURE: 144 MMHG

## 2025-02-05 DIAGNOSIS — Z86.16 HISTORY OF COVID-19: ICD-10-CM

## 2025-02-05 DIAGNOSIS — J96.01 ACUTE HYPOXEMIC RESPIRATORY FAILURE (MULTI): ICD-10-CM

## 2025-02-05 DIAGNOSIS — Z09 HOSPITAL DISCHARGE FOLLOW-UP: Primary | ICD-10-CM

## 2025-02-05 DIAGNOSIS — N64.4 BREAST TENDERNESS: ICD-10-CM

## 2025-02-05 DIAGNOSIS — Z23 FLU VACCINE NEED: ICD-10-CM

## 2025-02-05 PROCEDURE — 99214 OFFICE O/P EST MOD 30 MIN: CPT | Performed by: NURSE PRACTITIONER

## 2025-02-05 PROCEDURE — 3079F DIAST BP 80-89 MM HG: CPT | Performed by: NURSE PRACTITIONER

## 2025-02-05 PROCEDURE — 3008F BODY MASS INDEX DOCD: CPT | Performed by: NURSE PRACTITIONER

## 2025-02-05 PROCEDURE — 1036F TOBACCO NON-USER: CPT | Performed by: NURSE PRACTITIONER

## 2025-02-05 PROCEDURE — 3077F SYST BP >= 140 MM HG: CPT | Performed by: NURSE PRACTITIONER

## 2025-02-05 PROCEDURE — 90471 IMMUNIZATION ADMIN: CPT | Performed by: NURSE PRACTITIONER

## 2025-02-05 PROCEDURE — 90673 RIV3 VACCINE NO PRESERV IM: CPT | Performed by: NURSE PRACTITIONER

## 2025-02-05 PROCEDURE — 1111F DSCHRG MED/CURRENT MED MERGE: CPT | Performed by: NURSE PRACTITIONER

## 2025-02-05 ASSESSMENT — ENCOUNTER SYMPTOMS
GASTROINTESTINAL NEGATIVE: 1
COUGH: 1
NEUROLOGICAL NEGATIVE: 1
FATIGUE: 1
CARDIOVASCULAR NEGATIVE: 1
PSYCHIATRIC NEGATIVE: 1
FEVER: 0

## 2025-02-05 NOTE — PROGRESS NOTES
"Patient: Mariaelena Donis  : 1970  PCP: Tori Carolina, APRN-CNP  MRN: [id number]  Program: Transitional Care Management  Status: Enrolled  Effective Dates: 2025 - present  Responsible Staff: Tawny Solis LPN  Social Drivers to be Addressed: Alcohol Use, Financial Resource Strain, Physical Activity, Social Connections, Stress, Transportation Needs       Mariaelena Donis is a 54 y.o. female presenting today for follow-up after being discharged from the hospital 16 days ago. The main problem requiring admission was acute hypoxemic respiratory failure/covid-19 virus infection.  The discharge summary and/or Transitional Care Management documentation was reviewed. Medication reconciliation was performed as indicated via the \"Mariano as Reviewed\" timestamp.     Mariaelena Donis was contacted by Transitional Care Management services two days after her discharge. This encounter and supporting documentation was reviewed.    COVID infection/respiratory failure/hypoxemia:  She is on room air currently, denies shortness of breath, and does still have a slight cough intermittently but this is improving.     She also has concerns of right breast pain. This has been going on since the hospital. She was concerned that it was due to her fall last month or the steroids she was on in the hospital. She denies any bruising to area. She denies any lumps felt, but has noticed some \"fullness\" feeling.      Review of Systems   Constitutional:  Positive for fatigue. Negative for fever.   HENT: Negative.     Respiratory:  Positive for cough (intermittent-improving).    Cardiovascular: Negative.    Gastrointestinal: Negative.    Genitourinary: Negative.    Musculoskeletal:         Right sided pain/breast pain has been going on since she fell a couple weeks ago, but has gotten worse since after being admitted at the hospital.    Neurological: Negative.    Psychiatric/Behavioral: Negative.         /84   Pulse 60   Ht 1.549 m (5' 1\") "   Wt 61.2 kg (135 lb)   BMI 25.51 kg/m²     Physical Exam  Vitals and nursing note reviewed.   Constitutional:       Appearance: Normal appearance.   HENT:      Head: Normocephalic and atraumatic.   Cardiovascular:      Rate and Rhythm: Normal rate and regular rhythm.      Pulses: Normal pulses.      Heart sounds: Normal heart sounds.   Pulmonary:      Effort: Pulmonary effort is normal.      Breath sounds: Normal breath sounds.   Chest:   Breasts:     Right: Tenderness present. No swelling, bleeding, mass, nipple discharge or skin change.          Comments: Tenderness to right lateral aspect of breast  Skin:     General: Skin is warm and dry.      Findings: No ecchymosis.   Neurological:      General: No focal deficit present.      Mental Status: She is alert and oriented to person, place, and time.   Psychiatric:         Mood and Affect: Mood normal.         Behavior: Behavior normal.         Thought Content: Thought content normal.         Judgment: Judgment normal.         The complexity of medical decision making for this patient's transitional care is moderate.    Assessment/Plan   Problem List Items Addressed This Visit             ICD-10-CM    BMI 25.0-25.9,adult Z68.25     Other Visit Diagnoses         Codes    Hospital discharge follow-up    -  Primary Z09    History of COVID-19     Z86.16    Acute hypoxemic respiratory failure (Multi)     J96.01    on room air     Breast tenderness     N64.4    discussed getting a diagnostic mammogram-patient declined for now  mammo in June showed unchanged R sided posteriolateral lymph node     Flu vaccine need     Z23    Relevant Orders    Flu vaccine, trivalent, preservative free, no egg protein, age 18y+ (Flublok) (Completed)          Follow up as scheduled in July.  Return precautions discussed. Verbalized understanding. She declined diagnostic imaging of right breast-she would like to wait to see if this improves. She will let us know in a week if it does  not.    For any new medications that were prescribed today, the patient was educated about their indications for use, administration, frequency and potential side effects of the medication.

## 2025-02-05 NOTE — PROGRESS NOTES
"Subjective   Patient ID: Mariaelena Donis is a 54 y.o. female who presents for Follow-up.    HPI   Mariaelena returns     Review of Systems    Objective   /84   Pulse 60   Ht 1.549 m (5' 1\")   Wt 61.2 kg (135 lb)   BMI 25.51 kg/m²     Physical Exam    Assessment/Plan   Problem List Items Addressed This Visit    None  Visit Diagnoses         Codes    Hospital discharge follow-up    -  Primary Z09    Flu vaccine need     Z23    Relevant Orders    Flu vaccine, trivalent, preservative free, no egg protein, age 18y+ (Flublok)               "

## 2025-02-07 ENCOUNTER — OFFICE VISIT (OUTPATIENT)
Dept: SURGICAL ONCOLOGY | Facility: CLINIC | Age: 55
End: 2025-02-07
Payer: COMMERCIAL

## 2025-02-07 VITALS
HEART RATE: 80 BPM | HEIGHT: 61 IN | SYSTOLIC BLOOD PRESSURE: 153 MMHG | BODY MASS INDEX: 25.53 KG/M2 | TEMPERATURE: 98.6 F | OXYGEN SATURATION: 97 % | WEIGHT: 135.2 LBS | DIASTOLIC BLOOD PRESSURE: 80 MMHG

## 2025-02-07 DIAGNOSIS — K86.2 PANCREATIC CYST (HHS-HCC): ICD-10-CM

## 2025-02-07 PROCEDURE — 3077F SYST BP >= 140 MM HG: CPT | Performed by: PHYSICIAN ASSISTANT

## 2025-02-07 PROCEDURE — 99204 OFFICE O/P NEW MOD 45 MIN: CPT | Performed by: PHYSICIAN ASSISTANT

## 2025-02-07 PROCEDURE — 3079F DIAST BP 80-89 MM HG: CPT | Performed by: PHYSICIAN ASSISTANT

## 2025-02-07 PROCEDURE — 3008F BODY MASS INDEX DOCD: CPT | Performed by: PHYSICIAN ASSISTANT

## 2025-02-07 PROCEDURE — 99214 OFFICE O/P EST MOD 30 MIN: CPT | Performed by: PHYSICIAN ASSISTANT

## 2025-02-07 ASSESSMENT — PAIN SCALES - GENERAL: PAINLEVEL_OUTOF10: 0-NO PAIN

## 2025-02-07 NOTE — PROGRESS NOTES
"Subjective   Ms. Donis is a 54-year-old female who is referred by SACHA Velazco for a pancreatic cyst.    She presented to the ED in July with LLQ abdominal pain. While there, she had a CT A/P that demonstrated a 1.4 cm x 1.1 cm hypodense lesion along the superior aspect of the proximal body of the pancreas (increased in size from prior study when it measured 1.0 cm x 0.8 cm in 2021).     She was referred here to discuss.    She denies a personal history of acute pancreatitis.    She denies chronic abdominal pain, early satiety, poor appetite, jaundice or unintentional weight loss.    Medical and surgical history: HTN, HLD, hypothyroid, s/p cholecystectomy, s/p partial hysterectomy.  Family history: No PDAC.   Social history: Non-smoker. No alcohol use. No illicits.     Objective     /80   Pulse 80   Temp 37 °C (98.6 °F)   Ht 1.549 m (5' 1\")   Wt 61.3 kg (135 lb 3.2 oz)   SpO2 97%   BMI 25.55 kg/m²      Physical Exam  General: in no acute distress, comfortable   Eyes: no pallor or scleral icterus  Respiratory: even, unlabored  Gastrointestinal: non-distended, abdomen soft, non-tender, no masses   Musculoskeletal: normal gate, no deformities   Integumentary: no concerning lesions, no jaundice  Neurologic: no gross deficits  Psychiatric: cognition intact, mood appropriate    Assessment/Plan   Ms. Donis is a 54-year-old female who is referred by SACHA Velazco for a pancreatic cyst.    PLAN: She has a cyst in the body of the pancreas that was noted incidentally on recent imaging and in retrospect, has been present on imaging dating back to 2021. 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     I discussed that branch duct IPMNs represent potentially pre-malignant lesions and are managed based on the presence or absence of high risk stigmata or concerning features including cyst size, cyst growth over time, enhancing mural nodule or main duct dilatation. Management decisions are based on these " features, as well as, personal medical history, family history, presence or absence of symptoms and patient preference.    I reviewed her images. There are no high risk stigmata or worrisome features. There has clearly been growth but not worrisome as over the course of 4 years or so.    I have ordered a baseline MRI and will manage her surveillance moving forward.      Tomeka Herrera PA-C

## 2025-02-21 ENCOUNTER — APPOINTMENT (OUTPATIENT)
Dept: RADIOLOGY | Facility: HOSPITAL | Age: 55
End: 2025-02-21
Payer: COMMERCIAL

## 2025-03-07 ENCOUNTER — HOSPITAL ENCOUNTER (OUTPATIENT)
Dept: RADIOLOGY | Facility: HOSPITAL | Age: 55
Discharge: HOME | End: 2025-03-07
Payer: COMMERCIAL

## 2025-03-07 DIAGNOSIS — K86.2 PANCREATIC CYST (HHS-HCC): ICD-10-CM

## 2025-03-07 PROCEDURE — 2550000001 HC RX 255 CONTRASTS: Performed by: NURSE PRACTITIONER

## 2025-03-07 PROCEDURE — 76376 3D RENDER W/INTRP POSTPROCES: CPT | Performed by: STUDENT IN AN ORGANIZED HEALTH CARE EDUCATION/TRAINING PROGRAM

## 2025-03-07 PROCEDURE — 74183 MRI ABD W/O CNTR FLWD CNTR: CPT | Performed by: STUDENT IN AN ORGANIZED HEALTH CARE EDUCATION/TRAINING PROGRAM

## 2025-03-07 PROCEDURE — A9575 INJ GADOTERATE MEGLUMI 0.1ML: HCPCS | Performed by: NURSE PRACTITIONER

## 2025-03-07 PROCEDURE — 74183 MRI ABD W/O CNTR FLWD CNTR: CPT

## 2025-03-07 RX ORDER — GADOTERATE MEGLUMINE 376.9 MG/ML
12 INJECTION INTRAVENOUS
Status: COMPLETED | OUTPATIENT
Start: 2025-03-07 | End: 2025-03-07

## 2025-03-07 RX ADMIN — GADOTERATE MEGLUMINE 12 ML: 376.9 INJECTION INTRAVENOUS at 13:45

## 2025-03-12 ENCOUNTER — APPOINTMENT (OUTPATIENT)
Dept: GASTROENTEROLOGY | Facility: HOSPITAL | Age: 55
End: 2025-03-12
Payer: COMMERCIAL

## 2025-07-07 DIAGNOSIS — I10 PRIMARY HYPERTENSION: ICD-10-CM

## 2025-07-08 RX ORDER — LISINOPRIL 20 MG/1
20 TABLET ORAL DAILY
Qty: 90 TABLET | Refills: 1 | Status: SHIPPED | OUTPATIENT
Start: 2025-07-08

## 2025-07-09 ENCOUNTER — APPOINTMENT (OUTPATIENT)
Age: 55
End: 2025-07-09
Payer: COMMERCIAL

## 2025-07-22 ENCOUNTER — TELEPHONE (OUTPATIENT)
Age: 55
End: 2025-07-22

## 2025-07-22 DIAGNOSIS — R30.0 DYSURIA: Primary | ICD-10-CM

## 2025-07-22 RX ORDER — NITROFURANTOIN 25; 75 MG/1; MG/1
100 CAPSULE ORAL 2 TIMES DAILY
Qty: 10 CAPSULE | Refills: 0 | Status: SHIPPED | OUTPATIENT
Start: 2025-07-22 | End: 2025-07-27

## 2025-07-22 NOTE — TELEPHONE ENCOUNTER
Mariaelena called in she has been fighting a UTI for about a week not getting any better, frequency , burning on urination  She is with out Health Insurance so asking if something could be called in to [facility].

## 2025-08-13 ENCOUNTER — OFFICE VISIT (OUTPATIENT)
Age: 55
End: 2025-08-13
Payer: COMMERCIAL

## 2025-08-13 VITALS
BODY MASS INDEX: 24.63 KG/M2 | HEIGHT: 61 IN | WEIGHT: 130.44 LBS | DIASTOLIC BLOOD PRESSURE: 80 MMHG | HEART RATE: 64 BPM | SYSTOLIC BLOOD PRESSURE: 124 MMHG

## 2025-08-13 DIAGNOSIS — E03.9 ACQUIRED HYPOTHYROIDISM: ICD-10-CM

## 2025-08-13 DIAGNOSIS — I10 PRIMARY HYPERTENSION: ICD-10-CM

## 2025-08-13 DIAGNOSIS — Z00.00 WELL ADULT EXAM: Primary | ICD-10-CM

## 2025-08-13 DIAGNOSIS — E78.2 MIXED HYPERLIPIDEMIA: ICD-10-CM

## 2025-08-13 DIAGNOSIS — R53.82 CHRONIC FATIGUE: ICD-10-CM

## 2025-08-13 DIAGNOSIS — J30.2 SEASONAL ALLERGIES: ICD-10-CM

## 2025-08-13 DIAGNOSIS — R73.03 PREDIABETES: ICD-10-CM

## 2025-08-13 DIAGNOSIS — Z79.899 ENCOUNTER FOR LONG-TERM CURRENT USE OF MEDICATION: ICD-10-CM

## 2025-08-13 DIAGNOSIS — E53.8 B12 DEFICIENCY: ICD-10-CM

## 2025-08-13 PROCEDURE — 3074F SYST BP LT 130 MM HG: CPT | Performed by: NURSE PRACTITIONER

## 2025-08-13 PROCEDURE — 99396 PREV VISIT EST AGE 40-64: CPT | Performed by: NURSE PRACTITIONER

## 2025-08-13 PROCEDURE — 3079F DIAST BP 80-89 MM HG: CPT | Performed by: NURSE PRACTITIONER

## 2025-08-13 PROCEDURE — 3008F BODY MASS INDEX DOCD: CPT | Performed by: NURSE PRACTITIONER

## 2025-08-13 RX ORDER — LEVOTHYROXINE SODIUM 75 UG/1
75 TABLET ORAL DAILY
Qty: 90 TABLET | Refills: 1 | Status: SHIPPED | OUTPATIENT
Start: 2025-08-13

## 2025-08-13 RX ORDER — LISINOPRIL 20 MG/1
20 TABLET ORAL DAILY
Qty: 90 TABLET | Refills: 1 | Status: SHIPPED | OUTPATIENT
Start: 2025-08-13

## 2025-08-13 RX ORDER — ROSUVASTATIN CALCIUM 10 MG/1
10 TABLET, COATED ORAL DAILY
Qty: 90 TABLET | Refills: 1 | Status: SHIPPED | OUTPATIENT
Start: 2025-08-13

## 2025-08-13 RX ORDER — MONTELUKAST SODIUM 10 MG/1
10 TABLET ORAL NIGHTLY
Qty: 90 TABLET | Refills: 1 | Status: SHIPPED | OUTPATIENT
Start: 2025-08-13

## 2025-08-13 ASSESSMENT — PROMIS GLOBAL HEALTH SCALE
RATE_SOCIAL_SATISFACTION: VERY GOOD
RATE_PHYSICAL_HEALTH: GOOD
RATE_AVERAGE_PAIN: 8
RATE_QUALITY_OF_LIFE: VERY GOOD
RATE_MENTAL_HEALTH: GOOD
CARRYOUT_PHYSICAL_ACTIVITIES: MOSTLY
RATE_GENERAL_HEALTH: GOOD
RATE_AVERAGE_FATIGUE: MODERATE
EMOTIONAL_PROBLEMS: SOMETIMES
CARRYOUT_SOCIAL_ACTIVITIES: VERY GOOD

## 2025-08-19 LAB
25(OH)D3+25(OH)D2 SERPL-MCNC: 47 NG/ML (ref 30–100)
BASOPHILS # BLD AUTO: 13 CELLS/UL (ref 0–200)
BASOPHILS NFR BLD AUTO: 0.2 %
EOSINOPHIL # BLD AUTO: 163 CELLS/UL (ref 15–500)
EOSINOPHIL NFR BLD AUTO: 2.5 %
ERYTHROCYTE [DISTWIDTH] IN BLOOD BY AUTOMATED COUNT: 13.5 % (ref 11–15)
EST. AVERAGE GLUCOSE BLD GHB EST-MCNC: 126 MG/DL
EST. AVERAGE GLUCOSE BLD GHB EST-SCNC: 7 MMOL/L
HBA1C MFR BLD: 6 %
HCT VFR BLD AUTO: 38.4 % (ref 35–45)
HGB BLD-MCNC: 12.8 G/DL (ref 11.7–15.5)
INSULIN SERPL-ACNC: 24.1 UIU/ML
LYMPHOCYTES # BLD AUTO: 2132 CELLS/UL (ref 850–3900)
LYMPHOCYTES NFR BLD AUTO: 32.8 %
MCH RBC QN AUTO: 29.6 PG (ref 27–33)
MCHC RBC AUTO-ENTMCNC: 33.3 G/DL (ref 32–36)
MCV RBC AUTO: 88.7 FL (ref 80–100)
MONOCYTES # BLD AUTO: 553 CELLS/UL (ref 200–950)
MONOCYTES NFR BLD AUTO: 8.5 %
NEUTROPHILS # BLD AUTO: 3640 CELLS/UL (ref 1500–7800)
NEUTROPHILS NFR BLD AUTO: 56 %
PLATELET # BLD AUTO: 320 THOUSAND/UL (ref 140–400)
PMV BLD REES-ECKER: 10.1 FL (ref 7.5–12.5)
RBC # BLD AUTO: 4.33 MILLION/UL (ref 3.8–5.1)
T3FREE SERPL-MCNC: 3.5 PG/ML (ref 2.3–4.2)
T4 FREE SERPL-MCNC: 1.3 NG/DL (ref 0.8–1.8)
TSH SERPL-ACNC: 0.33 MIU/L (ref 0.4–4.5)
VIT B12 SERPL-MCNC: 447 PG/ML (ref 200–1100)
WBC # BLD AUTO: 6.5 THOUSAND/UL (ref 3.8–10.8)

## 2025-08-21 PROBLEM — R53.82 CHRONIC FATIGUE: Status: ACTIVE | Noted: 2025-08-21

## 2025-08-21 ASSESSMENT — ENCOUNTER SYMPTOMS
DYSURIA: 0
BLOOD IN STOOL: 0
NERVOUS/ANXIOUS: 1
SLEEP DISTURBANCE: 1
LIGHT-HEADEDNESS: 0
CONSTIPATION: 0
NAUSEA: 0
COLOR CHANGE: 0
PALPITATIONS: 0
DYSPHORIC MOOD: 1
ABDOMINAL PAIN: 0
MYALGIAS: 0
DIZZINESS: 0
VOMITING: 0
CHEST TIGHTNESS: 0
HEADACHES: 0
ARTHRALGIAS: 0
DIARRHEA: 0
SHORTNESS OF BREATH: 0
FATIGUE: 1

## 2025-08-24 DIAGNOSIS — R73.03 PREDIABETES: Primary | ICD-10-CM

## 2025-08-24 RX ORDER — METFORMIN HYDROCHLORIDE 500 MG/1
500 TABLET, EXTENDED RELEASE ORAL
Qty: 90 TABLET | Refills: 1 | Status: SHIPPED | OUTPATIENT
Start: 2025-08-24

## 2026-08-19 ENCOUNTER — APPOINTMENT (OUTPATIENT)
Age: 56
End: 2026-08-19
Payer: COMMERCIAL